# Patient Record
Sex: FEMALE | URBAN - METROPOLITAN AREA
[De-identification: names, ages, dates, MRNs, and addresses within clinical notes are randomized per-mention and may not be internally consistent; named-entity substitution may affect disease eponyms.]

---

## 2023-10-05 ENCOUNTER — APPOINTMENT (RX ONLY)
Dept: URBAN - METROPOLITAN AREA CLINIC 81 | Facility: CLINIC | Age: 38
Setting detail: DERMATOLOGY
End: 2023-10-05

## 2023-10-05 DIAGNOSIS — L66.1 LICHEN PLANOPILARIS: ICD-10-CM

## 2023-10-05 DIAGNOSIS — L66.11 CLASSIC LICHEN PLANOPILARIS: ICD-10-CM

## 2023-10-05 PROCEDURE — 99204 OFFICE O/P NEW MOD 45 MIN: CPT

## 2023-10-05 PROCEDURE — ? ADDITIONAL NOTES

## 2023-10-05 PROCEDURE — ? PATIENT SPECIFIC COUNSELING

## 2023-10-05 ASSESSMENT — LOCATION SIMPLE DESCRIPTION DERM: LOCATION SIMPLE: POSTERIOR SCALP

## 2023-10-05 ASSESSMENT — LOCATION DETAILED DESCRIPTION DERM: LOCATION DETAILED: POSTERIOR MID-PARIETAL SCALP

## 2023-10-05 ASSESSMENT — LOCATION ZONE DERM: LOCATION ZONE: SCALP

## 2023-10-05 NOTE — HPI: HAIR LOSS
How Did The Hair Loss Occur?: gradual in onset
How Severe Is Your Hair Loss?: moderate
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 Grover in [place], [place]. Dr. Grover is working her up for hair loss/LPP and will be continuing this. He has referred her for patch testing to rule out irritants in hair products.

## 2023-10-05 NOTE — PROCEDURE: ADDITIONAL NOTES
Detail Level: Simple
Additional Notes: -Disc with patient scheduling of patch testing\\n-Counseled patient on patch testing, disc the process, scheduling, realistic expectations and possible outcomes. Pt agreed to proceed. \\n-Pt is agreeable to continue hair loss visits and treatments with Dr. Reilly Dillard\\n- will schedule for patch testing (80) for either MWF or MThF
Render Risk Assessment In Note?: no

## 2023-10-16 ENCOUNTER — APPOINTMENT (RX ONLY)
Dept: URBAN - METROPOLITAN AREA CLINIC 81 | Facility: CLINIC | Age: 38
Setting detail: DERMATOLOGY
End: 2023-10-16

## 2023-10-16 DIAGNOSIS — L259 CONTACT DERMATITIS AND OTHER ECZEMA, UNSPECIFIED CAUSE: ICD-10-CM

## 2023-10-16 PROBLEM — L23.9 ALLERGIC CONTACT DERMATITIS, UNSPECIFIED CAUSE: Status: ACTIVE | Noted: 2023-10-16

## 2023-10-16 PROCEDURE — 95044 PATCH/APPLICATION TESTS: CPT

## 2023-10-16 PROCEDURE — ? ADDITIONAL NOTES

## 2023-10-16 PROCEDURE — ? PATCH TESTING

## 2023-10-16 NOTE — PROCEDURE: ADDITIONAL NOTES
Render Risk Assessment In Note?: no
Additional Notes: - Patches applied today (10/16/23).\\n- Instructions given to pt about patch testing expectations and care.\\n- Pt will return for first reading in 48 hours.
Detail Level: Simple

## 2023-10-18 ENCOUNTER — APPOINTMENT (RX ONLY)
Dept: URBAN - METROPOLITAN AREA CLINIC 81 | Facility: CLINIC | Age: 38
Setting detail: DERMATOLOGY
End: 2023-10-18

## 2023-10-18 DIAGNOSIS — L259 CONTACT DERMATITIS AND OTHER ECZEMA, UNSPECIFIED CAUSE: ICD-10-CM

## 2023-10-18 PROBLEM — L23.9 ALLERGIC CONTACT DERMATITIS, UNSPECIFIED CAUSE: Status: ACTIVE | Noted: 2023-10-18

## 2023-10-18 PROCEDURE — ? NORTH AMERICAN 80 PATCH TEST READING

## 2023-10-18 PROCEDURE — ? ADDITIONAL NOTES

## 2023-10-18 ASSESSMENT — LOCATION DETAILED DESCRIPTION DERM: LOCATION DETAILED: INFERIOR THORACIC SPINE

## 2023-10-18 ASSESSMENT — LOCATION ZONE DERM: LOCATION ZONE: TRUNK

## 2023-10-18 ASSESSMENT — LOCATION SIMPLE DESCRIPTION DERM: LOCATION SIMPLE: UPPER BACK

## 2023-10-18 NOTE — PROCEDURE: ADDITIONAL NOTES
Render Risk Assessment In Note?: no
Additional Notes: - Patches removed today (10/18/23).\\n- Pt will return for second reading in 24 hours.\\n- Pt reports some itching in areas where the tape is that was holding the patches in place.
Detail Level: Simple

## 2023-10-18 NOTE — PROCEDURE: [employer] 80 PATCH TEST READING
Allergen 39 Reaction: no reaction
Detail Level: Zone
Name Of Allergen 67: Lidocaine
Show Negative Results In The Note?: Yes
Show Allergen Counseling In The Note?: No
What Reading Time Point?: 48 hour
Allergen 67 Reaction: +/-
Number Of Patches Read: 80

## 2023-10-19 ENCOUNTER — APPOINTMENT (RX ONLY)
Dept: URBAN - METROPOLITAN AREA CLINIC 81 | Facility: CLINIC | Age: 38
Setting detail: DERMATOLOGY
End: 2023-10-19

## 2023-10-19 DIAGNOSIS — L259 CONTACT DERMATITIS AND OTHER ECZEMA, UNSPECIFIED CAUSE: ICD-10-CM

## 2023-10-19 PROBLEM — L23.9 ALLERGIC CONTACT DERMATITIS, UNSPECIFIED CAUSE: Status: ACTIVE | Noted: 2023-10-19

## 2023-10-19 PROCEDURE — ? ADDITIONAL NOTES

## 2023-10-19 PROCEDURE — ? NORTH AMERICAN 80 PATCH TEST READING

## 2023-10-19 ASSESSMENT — LOCATION SIMPLE DESCRIPTION DERM: LOCATION SIMPLE: UPPER BACK

## 2023-10-19 ASSESSMENT — LOCATION DETAILED DESCRIPTION DERM: LOCATION DETAILED: INFERIOR THORACIC SPINE

## 2023-10-19 ASSESSMENT — LOCATION ZONE DERM: LOCATION ZONE: TRUNK

## 2023-10-19 NOTE — PROCEDURE: [employer] 80 PATCH TEST READING
Allergen 39 Reaction: no reaction
Name Of Allergen 20: Nickelsulfate hexahydrate
Detail Level: Zone
Show Negative Results In The Note?: Yes
Show Allergen Counseling In The Note?: No
Allergen 20 Reaction: +/-
What Reading Time Point?: 72 hour
Number Of Patches Read: 80

## 2025-01-17 NOTE — PROCEDURE: ADDITIONAL NOTES
Pt dizziness and nausea has improved.   
Render Risk Assessment In Note?: no
Additional Notes: - Patches read today for last reading (10/19/23).\\n- Pt instructed to follow up with Dr. Grover ([facility]) as currently scheduled and bring results to visit to discuss next step in treatment plan.\\n- Handout given to pt today further explaining the allergen that she had a +/- reaction to (nickelsulfate hexahydrate).
Detail Level: Simple